# Patient Record
Sex: FEMALE | Race: WHITE | NOT HISPANIC OR LATINO | URBAN - METROPOLITAN AREA
[De-identification: names, ages, dates, MRNs, and addresses within clinical notes are randomized per-mention and may not be internally consistent; named-entity substitution may affect disease eponyms.]

---

## 2017-01-17 ENCOUNTER — OUTPATIENT (OUTPATIENT)
Dept: OUTPATIENT SERVICES | Facility: HOSPITAL | Age: 62
LOS: 1 days | End: 2017-01-17
Payer: COMMERCIAL

## 2017-01-17 ENCOUNTER — APPOINTMENT (OUTPATIENT)
Dept: RADIOLOGY | Facility: IMAGING CENTER | Age: 62
End: 2017-01-17

## 2017-01-17 ENCOUNTER — APPOINTMENT (OUTPATIENT)
Dept: ULTRASOUND IMAGING | Facility: IMAGING CENTER | Age: 62
End: 2017-01-17

## 2017-01-17 DIAGNOSIS — Z13.820 ENCOUNTER FOR SCREENING FOR OSTEOPOROSIS: ICD-10-CM

## 2017-01-17 DIAGNOSIS — N94.89 OTHER SPECIFIED CONDITIONS ASSOCIATED WITH FEMALE GENITAL ORGANS AND MENSTRUAL CYCLE: ICD-10-CM

## 2017-01-17 PROCEDURE — 76856 US EXAM PELVIC COMPLETE: CPT

## 2017-01-17 PROCEDURE — 77080 DXA BONE DENSITY AXIAL: CPT

## 2017-04-25 ENCOUNTER — RESULT REVIEW (OUTPATIENT)
Age: 62
End: 2017-04-25

## 2017-08-02 ENCOUNTER — APPOINTMENT (OUTPATIENT)
Dept: OTOLARYNGOLOGY | Facility: CLINIC | Age: 62
End: 2017-08-02

## 2020-11-03 PROBLEM — C50.919 BREAST CANCER: Status: ACTIVE | Noted: 2020-11-03

## 2020-11-05 ENCOUNTER — APPOINTMENT (OUTPATIENT)
Dept: SURGICAL ONCOLOGY | Facility: CLINIC | Age: 65
End: 2020-11-05
Payer: MEDICARE

## 2020-11-05 VITALS
SYSTOLIC BLOOD PRESSURE: 165 MMHG | RESPIRATION RATE: 16 BRPM | HEART RATE: 128 BPM | DIASTOLIC BLOOD PRESSURE: 84 MMHG | WEIGHT: 157 LBS | TEMPERATURE: 98.1 F | OXYGEN SATURATION: 96 % | HEIGHT: 66 IN | BODY MASS INDEX: 25.23 KG/M2

## 2020-11-05 DIAGNOSIS — C50.919 MALIGNANT NEOPLASM OF UNSPECIFIED SITE OF UNSPECIFIED FEMALE BREAST: ICD-10-CM

## 2020-11-05 DIAGNOSIS — R59.0 LOCALIZED ENLARGED LYMPH NODES: ICD-10-CM

## 2020-11-05 PROCEDURE — 99205 OFFICE O/P NEW HI 60 MIN: CPT

## 2020-11-05 NOTE — ASSESSMENT
[FreeTextEntry1] : IMP:\par History of breast cancer, now with pathologic appearing enlarged cervical and supraclavicular lymph nodes extending in to the mediastinum. \par \par R/O lymphoma\par \par PLAN:\par Left supraclavicular lymph node biopsy

## 2020-11-05 NOTE — CONSULT LETTER
[Dear  ___] : Dear  [unfilled], [Consult Letter:] : I had the pleasure of evaluating your patient, [unfilled]. [Please see my note below.] : Please see my note below. [Consult Closing:] : Thank you very much for allowing me to participate in the care of this patient.  If you have any questions, please do not hesitate to contact me. [Sincerely,] : Sincerely, [FreeTextEntry1] : I will keep you informed of the final pathology results. [FreeTextEntry3] : Louis Orellana MD FACS\par Chief of Surgical Oncology\par \par

## 2020-11-05 NOTE — HISTORY OF PRESENT ILLNESS
[de-identified] : 64 year-old female presents for an initial consultation.  She has a history of breast cancer ( S/P bilateral mastectomies and adjuvant chemotherapy in 2004 ) and was referred for a CT of the neck on 11/1/20 to evaluate palpable enlarged lymph nodes in her neck.  Study revealed numerous prominent and abnormal bulky cervical and supraclavicular lymphadenopathy.  Abnormal lymph nodes extend into the mediastinum.  She was also referred for a NON contrast CT of the head which showed no acute intracranial hemorrhage and mild cerebral atrophy and periventricular white matter changes.\par \par She has no fevers, night sweats or fatigue.\par \par Referring MD: Dr. Damion Wagoner

## 2020-11-05 NOTE — PHYSICAL EXAM
[Normal] : oriented to person, place and time, with appropriate affect [de-identified] : Multiple enlarged lymph nodes in the neck and supraclavicular area [de-identified] : Bilateral breast reconstruction with implants; MARIAN [de-identified] : Multiple enlarged cervicle and supra-clavicular lymph nodes and also right axillary adenopathy but no left axillary adenopathy

## 2020-11-06 ENCOUNTER — APPOINTMENT (OUTPATIENT)
Dept: DISASTER EMERGENCY | Facility: CLINIC | Age: 65
End: 2020-11-06

## 2020-11-06 ENCOUNTER — TRANSCRIPTION ENCOUNTER (OUTPATIENT)
Age: 65
End: 2020-11-06

## 2020-11-06 ENCOUNTER — OUTPATIENT (OUTPATIENT)
Dept: OUTPATIENT SERVICES | Facility: HOSPITAL | Age: 65
LOS: 1 days | End: 2020-11-06
Payer: MEDICARE

## 2020-11-06 VITALS
HEIGHT: 66 IN | HEART RATE: 117 BPM | OXYGEN SATURATION: 98 % | DIASTOLIC BLOOD PRESSURE: 74 MMHG | SYSTOLIC BLOOD PRESSURE: 127 MMHG | RESPIRATION RATE: 16 BRPM | WEIGHT: 154.98 LBS | TEMPERATURE: 98 F

## 2020-11-06 DIAGNOSIS — C50.919 MALIGNANT NEOPLASM OF UNSPECIFIED SITE OF UNSPECIFIED FEMALE BREAST: Chronic | ICD-10-CM

## 2020-11-06 DIAGNOSIS — Z29.9 ENCOUNTER FOR PROPHYLACTIC MEASURES, UNSPECIFIED: ICD-10-CM

## 2020-11-06 DIAGNOSIS — R59.0 LOCALIZED ENLARGED LYMPH NODES: ICD-10-CM

## 2020-11-06 DIAGNOSIS — Z98.84 BARIATRIC SURGERY STATUS: Chronic | ICD-10-CM

## 2020-11-06 DIAGNOSIS — Z01.818 ENCOUNTER FOR OTHER PREPROCEDURAL EXAMINATION: ICD-10-CM

## 2020-11-06 DIAGNOSIS — F41.9 ANXIETY DISORDER, UNSPECIFIED: ICD-10-CM

## 2020-11-06 LAB
ANION GAP SERPL CALC-SCNC: 8 MMOL/L — SIGNIFICANT CHANGE UP (ref 5–17)
APTT BLD: 30.8 SEC — SIGNIFICANT CHANGE UP (ref 27.5–35.5)
BUN SERPL-MCNC: 14 MG/DL — SIGNIFICANT CHANGE UP (ref 7–18)
CALCIUM SERPL-MCNC: 9.2 MG/DL — SIGNIFICANT CHANGE UP (ref 8.4–10.5)
CHLORIDE SERPL-SCNC: 100 MMOL/L — SIGNIFICANT CHANGE UP (ref 96–108)
CO2 SERPL-SCNC: 30 MMOL/L — SIGNIFICANT CHANGE UP (ref 22–31)
CREAT SERPL-MCNC: 0.68 MG/DL — SIGNIFICANT CHANGE UP (ref 0.5–1.3)
GLUCOSE SERPL-MCNC: 78 MG/DL — SIGNIFICANT CHANGE UP (ref 70–99)
HCT VFR BLD CALC: 37.5 % — SIGNIFICANT CHANGE UP (ref 34.5–45)
HGB BLD-MCNC: 12 G/DL — SIGNIFICANT CHANGE UP (ref 11.5–15.5)
INR BLD: 1.06 RATIO — SIGNIFICANT CHANGE UP (ref 0.88–1.16)
MCHC RBC-ENTMCNC: 27.6 PG — SIGNIFICANT CHANGE UP (ref 27–34)
MCHC RBC-ENTMCNC: 32 GM/DL — SIGNIFICANT CHANGE UP (ref 32–36)
MCV RBC AUTO: 86.4 FL — SIGNIFICANT CHANGE UP (ref 80–100)
NRBC # BLD: 0 /100 WBCS — SIGNIFICANT CHANGE UP (ref 0–0)
PLATELET # BLD AUTO: 188 K/UL — SIGNIFICANT CHANGE UP (ref 150–400)
POTASSIUM SERPL-MCNC: 4.2 MMOL/L — SIGNIFICANT CHANGE UP (ref 3.5–5.3)
POTASSIUM SERPL-SCNC: 4.2 MMOL/L — SIGNIFICANT CHANGE UP (ref 3.5–5.3)
PROTHROM AB SERPL-ACNC: 12.6 SEC — SIGNIFICANT CHANGE UP (ref 10.6–13.6)
RBC # BLD: 4.34 M/UL — SIGNIFICANT CHANGE UP (ref 3.8–5.2)
RBC # FLD: 12.9 % — SIGNIFICANT CHANGE UP (ref 10.3–14.5)
SODIUM SERPL-SCNC: 138 MMOL/L — SIGNIFICANT CHANGE UP (ref 135–145)
WBC # BLD: 8.82 K/UL — SIGNIFICANT CHANGE UP (ref 3.8–10.5)
WBC # FLD AUTO: 8.82 K/UL — SIGNIFICANT CHANGE UP (ref 3.8–10.5)

## 2020-11-06 PROCEDURE — 93010 ELECTROCARDIOGRAM REPORT: CPT

## 2020-11-06 PROCEDURE — 85730 THROMBOPLASTIN TIME PARTIAL: CPT

## 2020-11-06 PROCEDURE — 36415 COLL VENOUS BLD VENIPUNCTURE: CPT

## 2020-11-06 PROCEDURE — 85027 COMPLETE CBC AUTOMATED: CPT

## 2020-11-06 PROCEDURE — 80048 BASIC METABOLIC PNL TOTAL CA: CPT

## 2020-11-06 PROCEDURE — G0463: CPT

## 2020-11-06 PROCEDURE — 85610 PROTHROMBIN TIME: CPT

## 2020-11-06 NOTE — H&P PST ADULT - NSANTHOSAYNRD_GEN_A_CORE
2020 January - unknown place sleep studies, as per pt no JESUS/No. JESUS screening performed.  STOP BANG Legend: 0-2 = LOW Risk; 3-4 = INTERMEDIATE Risk; 5-8 = HIGH Risk

## 2020-11-06 NOTE — H&P PST ADULT - NEGATIVE CARDIOVASCULAR SYMPTOMS
no chest pain/no peripheral edema/no paroxysmal nocturnal dyspnea/no palpitations/no dyspnea on exertion/no orthopnea/no claudication

## 2020-11-06 NOTE — H&P PST ADULT - NSICDXPROBLEM_GEN_ALL_CORE_FT
PROBLEM DIAGNOSES  Problem: DVT prophylaxis  Assessment and Plan: CAPRINI SCORE [CLOT]  AGE RELATED RISK FACTORS                                                       MOBILITY RELATED FACTORS  [ ] Age 41-60 years                                            (1 Point)                  [ ] Bed rest                                                        (1 Point)  [x ] Age: 61-74 years                                           (2 Points)                 [ ] Plaster cast                                                   (2 Points)  [ ] Age= 75 years                                              (3 Points)                 [ ] Bed bound for more than 72 hours                 (2 Points)  DISEASE RELATED RISK FACTORS                                               GENDER SPECIFIC FACTORS  [ ] Edema in the lower extremities                       (1 Point)                  [ ] Pregnancy                                                     (1 Point)  [ ] Varicose veins                                               (1 Point)                  [ ] Post-partum < 6 weeks                                   (1 Point)             [x ] BMI > 25 Kg/m2                                            (1 Point)                  [ ] Hormonal therapy  or oral contraception          (1 Point)                 [ ] Sepsis (in the previous month)                        (1 Point)                  [ ] History of pregnancy complications                 (1 point)  [ ] Pneumonia or serious lung disease                                               [ ] Unexplained or recurrent                     (1 Point)           (in the previous month)                               (1 Point)  [ ] Abnormal pulmonary function test                     (1 Point)                 SURGERY RELATED RISK FACTORS  [ ] Acute myocardial infarction                              (1 Point)                 [ ]  Section                                             (1 Point)  [ ] Congestive heart failure (in the previous month)  (1 Point)               [x ] Minor surgery                                                  (1 Point)   [ ] Inflammatory bowel disease                             (1 Point)                 [ ] Arthroscopic surgery                                        (2 Points)  [ ] Central venous access                                      (2 Points)                [ ] General surgery lasting more than 45 minutes   (2 Points)       [ ] Stroke (in the previous month)                          (5 Points)               [ ] Elective arthroplasty                                         (5 Points)                                                                                                                                               HEMATOLOGY RELATED FACTORS                                                 TRAUMA RELATED RISK FACTORS  [ ] Prior episodes of VTE                                     (3 Points)                [ ] Fracture of the hip, pelvis, or leg                       (5 Points)  [ ] Positive family history for VTE                         (3 Points)                 [ ] Acute spinal cord injury (in the previous month)  (5 Points)  [ ] Prothrombin 95038 A                                     (3 Points)                 [ ] Paralysis  (less than 1 month)                             (5 Points)  [ ] Factor V Leiden                                             (3 Points)                  [ ] Multiple Trauma within 1 month                        (5 Points)  [ ] Lupus anticoagulants                                     (3 Points)                                                           [ ] Anticardiolipin antibodies                               (3 Points)                                                       [ ] High homocysteine in the blood                      (3 Points)                                             [ ] Other congenital or acquired thrombophilia      (3 Points)                                                [ ] Heparin induced thrombocytopenia                  (3 Points)                                      4  Total Score [    4      ]  Caprini Score 0 - 2:  Low Risk, No VTE Prophylaxis required for most patients, encourage ambulation  Caprini Score 3 - 6:  At Risk, pharmacologic VTE prophylaxis is indicated for most patients (in the absence of a contraindication)  Caprini Score Greater than or = 7:  High Risk, pharmacologic VTE prophylaxis is indicated for most patients (in the absence of a contraindication)    Problem: Anxiety  Assessment and Plan: Patient to take Xanax at night before surgery and Zoloft in am of sx with few sips of water, patient verbalized understanding     Problem: Localized enlarged lymph nodes  Assessment and Plan: Patient  is scheduled for left supracervical lymph node bx on 2020.

## 2020-11-06 NOTE — H&P PST ADULT - NEGATIVE NEUROLOGICAL SYMPTOMS
no weakness/no generalized seizures/no loss of sensation/no difficulty walking/no paresthesias/no syncope/no tremors/no loss of consciousness/no confusion/no headache/no hemiparesis/no facial palsy/no transient paralysis/no focal seizures/no vertigo

## 2020-11-06 NOTE — H&P PST ADULT - NSICDXPASTMEDICALHX_GEN_ALL_CORE_FT
PAST MEDICAL HISTORY:  Anxiety     Breast cancer left 2004- treated with chemo & mastectomy,  right 2013, left arm restrictions    CAD (coronary artery disease) with panic attack and chest pain March 2012 after lap cholecystectomy, no stents,    HTN (hypertension) tx with diet and life style    Hyperlipidemia tx with diet    Lobular carcinoma in situ of right breast     Obesity (BMI 30-39.9)     Panic attack      PAST MEDICAL HISTORY:  Anxiety     Breast cancer left 2004- treated with chemo & mastectomy,  right 2013, left arm restrictions    CAD (coronary artery disease) with panic attack and chest pain March 2012 after lap cholecystectomy, no stents,    HTN (hypertension) tx with diet and life style    Hyperlipidemia tx with diet    Lobular carcinoma in situ of right breast     Obesity (BMI 30-39.9)     Panic attack     Tachycardia related to anxiety??    UTI (urinary tract infection), bacterial pt on abx

## 2020-11-06 NOTE — H&P PST ADULT - NEGATIVE GENERAL GENITOURINARY SYMPTOMS
no hematuria/no dysuria/no urinary hesitancy/no nocturia/no renal colic/no incontinence/normal urinary frequency

## 2020-11-06 NOTE — H&P PST ADULT - HISTORY OF PRESENT ILLNESS
64  year old female with history of Breast ca in 2004, s/p left mastectomy, had reconstructive surgery in 2012, and right breast reduction for asymmetry of breast in march 2013, s/p  right mastectomy , right sentinel node biopsy , possible axillary lymph node dissection and right breast reconstruction with expander and AlloDerm on 6/26/13, s/p gastric sleeve 2020 presented with back of head enlarged lymph node and left supracervical node and since  yesterday  submandibular enlargement of  lymph nodes . Diagnosed with Localized enlarged lymph node and is scheduled for left supracervical lymph node bx on 11/09/2020.

## 2020-11-06 NOTE — H&P PST ADULT - LYMPHATICS COMMENTS
submandibular enlarged nodes, no tender, soft no difficulty swallowing, Anesthesia DR. Morris notified , Mallapati II

## 2020-11-06 NOTE — H&P PST ADULT - TEMPERATURE IN CELSIUS (DEGREES C)
Detail Level: Zone Wound Evaluated By: [place] Add [id number] Cpt? (Important Note: In 2017 The Use Of 54090 Is Being Tracked By Cms To Determine Future Global Period Reimbursement For Global Periods): no Body Location Override (Optional - Billing Will Still Be Based On Selected Body Map Location If Applicable): right med FH 36.9

## 2020-11-06 NOTE — H&P PST ADULT - NSICDXPASTSURGICALHX_GEN_ALL_CORE_FT
PAST SURGICAL HISTORY:  Lobular carcinoma right mastectomy , right sentinel node biopsy , possible axillary lymph node dissection and right breast reconstruction with expander and AlloDerm on 6/26/13    S/P laparoscopic cholecystectomy 2012    S/P mastectomy left 2004    S/P reconstruction procedure left breast 8/2012, 10/2012, implant silicone    S/P reduction mammoplasty right breast 3/2013- revealed focal lobular carcinoma in situ    S/P tonsillectomy and adenoidectomy age 2    S/P tubal ligation 1997     PAST SURGICAL HISTORY:  H/O bariatric surgery gastric sleeve-1/2020    Lobular carcinoma right mastectomy , right sentinel node biopsy , possible axillary lymph node dissection and right breast reconstruction with expander and AlloDerm on 6/26/13    S/P laparoscopic cholecystectomy 2012    S/P mastectomy left 2004    S/P reconstruction procedure left breast 8/2012, 10/2012, implant silicone    S/P reduction mammoplasty right breast 3/2013- revealed focal lobular carcinoma in situ    S/P tonsillectomy and adenoidectomy age 2    S/P tubal ligation 1997

## 2020-11-06 NOTE — H&P PST ADULT - ADDITIONAL PE
NORMA Auguste discussed case with Dr. Gutierrez Anesthesia, pt denies chest pain, SEPULVEDA, ,had gastric surgery in 01/2020 and cardiac evaluation that time, with echo and stress test- normal , had sleep studies, unable obtain report , as per pt no JESUS- no need for medical clearance

## 2020-11-06 NOTE — H&P PST ADULT - GASTROINTESTINAL DETAILS
nontender/normal/no rebound tenderness/bowel sounds normal/no bruit/soft/no masses palpable/no distention

## 2020-11-07 LAB — SARS-COV-2 N GENE NPH QL NAA+PROBE: NOT DETECTED

## 2020-11-08 ENCOUNTER — TRANSCRIPTION ENCOUNTER (OUTPATIENT)
Age: 65
End: 2020-11-08

## 2020-11-09 ENCOUNTER — RESULT REVIEW (OUTPATIENT)
Age: 65
End: 2020-11-09

## 2020-11-09 ENCOUNTER — OUTPATIENT (OUTPATIENT)
Dept: OUTPATIENT SERVICES | Facility: HOSPITAL | Age: 65
LOS: 1 days | End: 2020-11-09
Payer: MEDICARE

## 2020-11-09 ENCOUNTER — APPOINTMENT (OUTPATIENT)
Dept: SURGICAL ONCOLOGY | Facility: HOSPITAL | Age: 65
End: 2020-11-09

## 2020-11-09 VITALS
HEART RATE: 97 BPM | TEMPERATURE: 98 F | RESPIRATION RATE: 17 BRPM | SYSTOLIC BLOOD PRESSURE: 118 MMHG | DIASTOLIC BLOOD PRESSURE: 64 MMHG | OXYGEN SATURATION: 96 %

## 2020-11-09 VITALS
DIASTOLIC BLOOD PRESSURE: 57 MMHG | WEIGHT: 154.98 LBS | HEART RATE: 104 BPM | TEMPERATURE: 98 F | RESPIRATION RATE: 18 BRPM | HEIGHT: 66 IN | SYSTOLIC BLOOD PRESSURE: 140 MMHG | OXYGEN SATURATION: 98 %

## 2020-11-09 DIAGNOSIS — Z98.84 BARIATRIC SURGERY STATUS: Chronic | ICD-10-CM

## 2020-11-09 DIAGNOSIS — C50.919 MALIGNANT NEOPLASM OF UNSPECIFIED SITE OF UNSPECIFIED FEMALE BREAST: Chronic | ICD-10-CM

## 2020-11-09 DIAGNOSIS — R59.0 LOCALIZED ENLARGED LYMPH NODES: ICD-10-CM

## 2020-11-09 PROCEDURE — 88365 INSITU HYBRIDIZATION (FISH): CPT | Mod: 26,59

## 2020-11-09 PROCEDURE — 88364 INSITU HYBRIDIZATION (FISH): CPT | Mod: 26

## 2020-11-09 PROCEDURE — 88342 IMHCHEM/IMCYTCHM 1ST ANTB: CPT | Mod: 26,59

## 2020-11-09 PROCEDURE — 38700 REMOVAL OF LYMPH NODES NECK: CPT

## 2020-11-09 PROCEDURE — G0452: CPT | Mod: 26

## 2020-11-09 PROCEDURE — 88305 TISSUE EXAM BY PATHOLOGIST: CPT | Mod: 26

## 2020-11-09 PROCEDURE — 88341 IMHCHEM/IMCYTCHM EA ADD ANTB: CPT | Mod: 26,59

## 2020-11-09 PROCEDURE — 88360 TUMOR IMMUNOHISTOCHEM/MANUAL: CPT | Mod: 26

## 2020-11-09 RX ORDER — FENTANYL CITRATE 50 UG/ML
25 INJECTION INTRAVENOUS
Refills: 0 | Status: DISCONTINUED | OUTPATIENT
Start: 2020-11-09 | End: 2020-11-09

## 2020-11-09 RX ORDER — SERTRALINE 25 MG/1
1 TABLET, FILM COATED ORAL
Qty: 0 | Refills: 0 | DISCHARGE

## 2020-11-09 RX ORDER — ONDANSETRON 8 MG/1
4 TABLET, FILM COATED ORAL ONCE
Refills: 0 | Status: DISCONTINUED | OUTPATIENT
Start: 2020-11-09 | End: 2020-11-09

## 2020-11-09 RX ORDER — SODIUM CHLORIDE 9 MG/ML
3 INJECTION INTRAMUSCULAR; INTRAVENOUS; SUBCUTANEOUS EVERY 8 HOURS
Refills: 0 | Status: DISCONTINUED | OUTPATIENT
Start: 2020-11-09 | End: 2020-11-09

## 2020-11-09 RX ORDER — METHENAMINE MANDELATE 1 G
1 TABLET ORAL
Qty: 0 | Refills: 0 | DISCHARGE

## 2020-11-09 RX ORDER — FENTANYL CITRATE 50 UG/ML
50 INJECTION INTRAVENOUS
Refills: 0 | Status: DISCONTINUED | OUTPATIENT
Start: 2020-11-09 | End: 2020-11-09

## 2020-11-09 RX ORDER — ALPRAZOLAM 0.25 MG
1 TABLET ORAL
Qty: 0 | Refills: 0 | DISCHARGE

## 2020-11-09 RX ORDER — OXYCODONE HYDROCHLORIDE 5 MG/1
5 TABLET ORAL ONCE
Refills: 0 | Status: DISCONTINUED | OUTPATIENT
Start: 2020-11-09 | End: 2020-11-16

## 2020-11-09 RX ORDER — ACETAMINOPHEN 500 MG
1000 TABLET ORAL ONCE
Refills: 0 | Status: DISCONTINUED | OUTPATIENT
Start: 2020-11-09 | End: 2020-11-09

## 2020-11-09 RX ADMIN — SODIUM CHLORIDE 3 MILLILITER(S): 9 INJECTION INTRAMUSCULAR; INTRAVENOUS; SUBCUTANEOUS at 06:54

## 2020-11-09 NOTE — ASU DISCHARGE PLAN (ADULT/PEDIATRIC) - CARE PROVIDER_API CALL
Louis Orellana  SURGERY  [address] [place] [address]  Phone: [phone]  Fax: [phone]  Established Patient  Follow Up Time: 2 weeks

## 2020-11-09 NOTE — BRIEF OPERATIVE NOTE - NSICDXBRIEFPREOP_GEN_ALL_CORE_FT
PRE-OP DIAGNOSIS:  Supraclavicular adenopathy 09-Nov-2020 08:52:41  Kasia Blanchard  Cervical adenopathy 09-Nov-2020 08:52:31  Kasia Blanchard

## 2020-11-09 NOTE — BRIEF OPERATIVE NOTE - OPERATION/FINDINGS
multiple bilateral supraclavicular and cervical adenopathy - removal of left cervical lymph node sent as fresh to r/o lymphoma

## 2020-11-09 NOTE — BRIEF OPERATIVE NOTE - NSICDXBRIEFPROCEDURE_GEN_ALL_CORE_FT
PROCEDURES:  Excisional or needle biopsy, lymph node, superficial cervical, inguinal, or axillary 09-Nov-2020 08:52:19  Kasia Blanchard

## 2020-11-09 NOTE — ASU DISCHARGE PLAN (ADULT/PEDIATRIC) - CALL YOUR DOCTOR IF YOU HAVE ANY OF THE FOLLOWING:
Swelling that gets worse/Bleeding that does not stop/Fever greater than (need to indicate Fahrenheit or Celsius)/Wound/Surgical Site with redness, or foul smelling discharge or pus

## 2020-11-09 NOTE — ASU DISCHARGE PLAN (ADULT/PEDIATRIC) - ASU DC SPECIAL INSTRUCTIONSFT
- for you postoperative pain, take over the counter pain medications (alternate tylenol 650mg and ibuprofen/motrin 400-600mg as needed every 4-6 hours as recommended on the bottle)     - please follow up in Dr. Orellana clinic 2 weeks after your surgery for wound check

## 2020-11-12 ENCOUNTER — RESULT REVIEW (OUTPATIENT)
Age: 65
End: 2020-11-12

## 2020-11-12 LAB — TM INTERPRETATION: SIGNIFICANT CHANGE UP

## 2020-11-15 PROCEDURE — G0452: CPT | Mod: 26

## 2020-11-16 PROBLEM — D05.01 LOBULAR CARCINOMA IN SITU OF RIGHT BREAST: Chronic | Status: ACTIVE | Noted: 2020-11-06

## 2020-11-16 PROBLEM — N39.0 URINARY TRACT INFECTION, SITE NOT SPECIFIED: Chronic | Status: ACTIVE | Noted: 2020-11-06

## 2020-11-16 PROBLEM — R00.0 TACHYCARDIA, UNSPECIFIED: Chronic | Status: ACTIVE | Noted: 2020-11-06

## 2020-11-16 PROBLEM — I25.10 ATHEROSCLEROTIC HEART DISEASE OF NATIVE CORONARY ARTERY WITHOUT ANGINA PECTORIS: Chronic | Status: ACTIVE | Noted: 2020-11-06

## 2020-11-16 PROCEDURE — 81342 TRG GENE REARRANGEMENT ANAL: CPT

## 2020-11-16 PROCEDURE — 88185 FLOWCYTOMETRY/TC ADD-ON: CPT

## 2020-11-16 PROCEDURE — 88341 IMHCHEM/IMCYTCHM EA ADD ANTB: CPT

## 2020-11-16 PROCEDURE — 88365 INSITU HYBRIDIZATION (FISH): CPT

## 2020-11-16 PROCEDURE — 88305 TISSUE EXAM BY PATHOLOGIST: CPT

## 2020-11-16 PROCEDURE — 81264 IGK REARRANGEABN CLONAL POP: CPT

## 2020-11-16 PROCEDURE — 81261 IGH GENE REARRANGE AMP METH: CPT

## 2020-11-16 PROCEDURE — 88184 FLOWCYTOMETRY/ TC 1 MARKER: CPT

## 2020-11-16 PROCEDURE — 88360 TUMOR IMMUNOHISTOCHEM/MANUAL: CPT

## 2020-11-16 PROCEDURE — 38724 REMOVAL OF LYMPH NODES NECK: CPT

## 2020-11-16 PROCEDURE — 88342 IMHCHEM/IMCYTCHM 1ST ANTB: CPT

## 2020-11-16 PROCEDURE — 81340 TRB@ GENE REARRANGE AMPLIFY: CPT

## 2020-11-16 PROCEDURE — 87205 SMEAR GRAM STAIN: CPT

## 2020-11-16 PROCEDURE — 88364 INSITU HYBRIDIZATION (FISH): CPT

## 2020-11-18 LAB
DNA PLOIDY SPEC FC-IMP: SIGNIFICANT CHANGE UP
DNA PLOIDY SPEC FC-IMP: SIGNIFICANT CHANGE UP

## 2020-11-20 LAB — SURGICAL PATHOLOGY STUDY: SIGNIFICANT CHANGE UP

## 2020-12-02 PROBLEM — Z80.3 FAMILY HISTORY OF MALIGNANT NEOPLASM OF BREAST: Status: ACTIVE | Noted: 2020-12-02

## 2020-12-02 PROBLEM — C85.10 B-CELL LYMPHOMA, UNSPECIFIED B-CELL LYMPHOMA TYPE, UNSPECIFIED BODY REGION: Status: ACTIVE | Noted: 2020-12-02

## 2020-12-02 PROBLEM — Z80.0 FAMILY HISTORY OF MALIGNANT NEOPLASM OF COLON: Status: ACTIVE | Noted: 2020-12-02

## 2020-12-03 ENCOUNTER — APPOINTMENT (OUTPATIENT)
Dept: SURGICAL ONCOLOGY | Facility: CLINIC | Age: 65
End: 2020-12-03
Payer: MEDICARE

## 2020-12-03 VITALS
OXYGEN SATURATION: 98 % | HEART RATE: 78 BPM | DIASTOLIC BLOOD PRESSURE: 75 MMHG | SYSTOLIC BLOOD PRESSURE: 137 MMHG | RESPIRATION RATE: 16 BRPM | WEIGHT: 150 LBS | HEIGHT: 66 IN | TEMPERATURE: 98 F | BODY MASS INDEX: 24.11 KG/M2

## 2020-12-03 DIAGNOSIS — Z80.3 FAMILY HISTORY OF MALIGNANT NEOPLASM OF BREAST: ICD-10-CM

## 2020-12-03 DIAGNOSIS — Z80.0 FAMILY HISTORY OF MALIGNANT NEOPLASM OF DIGESTIVE ORGANS: ICD-10-CM

## 2020-12-03 DIAGNOSIS — C85.10 UNSPECIFIED B-CELL LYMPHOMA, UNSPECIFIED SITE: ICD-10-CM

## 2020-12-03 PROCEDURE — 99024 POSTOP FOLLOW-UP VISIT: CPT

## 2020-12-03 NOTE — HISTORY OF PRESENT ILLNESS
[de-identified] : 65 year-old female presents for an initial post-op. She is s/p left supraomohyoid neck dissection 11/9/20. Surgical pathology revealed: EBV- positive diffuse large B-cell lymphoma; low-grade follicular lymphoma. \par \par she has already started treatment with R-CHOP\par She has been having trouble with venous access.\par \par Pertinent History: \par She has a history of breast cancer ( S/P bilateral mastectomies and adjuvant chemotherapy in 2004 ) and was referred for a CT of the neck on 11/1/20 to evaluate palpable enlarged lymph nodes in her neck.  Study revealed numerous prominent and abnormal bulky cervical and supraclavicular lymphadenopathy.  Abnormal lymph nodes extend into the mediastinum.  She was also referred for a NON contrast CT of the head which showed no acute intracranial hemorrhage and mild cerebral atrophy and periventricular white matter changes.\par \par She has no fevers, night sweats or fatigue.\par \par Referring MD: Dr. Damion Wagoner

## 2020-12-03 NOTE — ASSESSMENT
[FreeTextEntry1] : IMP:\par s/p left supraomohyoid neck dissection 11/9/20. \par PATH:EBV- positive diffuse large B-cell lymphoma; low-grade follicular lymphoma. \par \par Poor venous access\par \par \par PLAN:\par R-CHOP with Dr. Wagoner\par possible mediport placement ( pt. will call if it is needed )

## 2020-12-03 NOTE — CONSULT LETTER
[Dear  ___] : Dear  [unfilled], [Courtesy Letter:] : I had the pleasure of seeing your patient, [unfilled], in my office today. [Please see my note below.] : Please see my note below. [Consult Closing:] : Thank you very much for allowing me to participate in the care of this patient.  If you have any questions, please do not hesitate to contact me. [Sincerely,] : Sincerely, [FreeTextEntry1] : She will continue her treatment with you. [FreeTextEntry3] : Louis Orellana MD FACS\par Chief of Surgical Oncology\par \par